# Patient Record
Sex: MALE | Race: BLACK OR AFRICAN AMERICAN
[De-identification: names, ages, dates, MRNs, and addresses within clinical notes are randomized per-mention and may not be internally consistent; named-entity substitution may affect disease eponyms.]

---

## 2018-11-07 ENCOUNTER — HOSPITAL ENCOUNTER (EMERGENCY)
Dept: HOSPITAL 88 - ER | Age: 58
Discharge: NURSING, REHAB OR LONG-TERM CARE | End: 2018-11-07
Payer: MEDICARE

## 2018-11-07 VITALS — HEIGHT: 64 IN | WEIGHT: 156 LBS | BODY MASS INDEX: 26.63 KG/M2

## 2018-11-07 DIAGNOSIS — K70.10: ICD-10-CM

## 2018-11-07 DIAGNOSIS — G31.2: Primary | ICD-10-CM

## 2018-11-07 DIAGNOSIS — F17.210: ICD-10-CM

## 2018-11-07 DIAGNOSIS — N18.9: ICD-10-CM

## 2018-11-07 DIAGNOSIS — I10: ICD-10-CM

## 2018-11-07 LAB
ALBUMIN SERPL-MCNC: 3.4 G/DL (ref 3.5–5)
ALBUMIN/GLOB SERPL: 0.9 {RATIO} (ref 0.8–2)
ALP SERPL-CCNC: 68 IU/L (ref 40–150)
ALT SERPL-CCNC: 111 IU/L (ref 0–55)
AMPHETAMINES UR QL SCN>1000 NG/ML: NEGATIVE
ANION GAP SERPL CALC-SCNC: 12.9 MMOL/L (ref 8–16)
BACTERIA URNS QL MICRO: (no result) /HPF
BASOPHILS # BLD AUTO: 0 10*3/UL (ref 0–0.1)
BASOPHILS NFR BLD AUTO: 0.5 % (ref 0–1)
BENZODIAZ UR QL SCN: POSITIVE
BILIRUB UR QL: NEGATIVE
BUN SERPL-MCNC: 12 MG/DL (ref 7–26)
BUN/CREAT SERPL: 9 (ref 6–25)
CALCIUM SERPL-MCNC: 9.5 MG/DL (ref 8.4–10.2)
CHLORIDE SERPL-SCNC: 102 MMOL/L (ref 98–107)
CK MB SERPL-MCNC: 1 NG/ML (ref 0–5)
CK SERPL-CCNC: 85 IU/L (ref 30–200)
CLARITY UR: CLEAR
CO2 SERPL-SCNC: 25 MMOL/L (ref 22–29)
COLOR UR: YELLOW
DEPRECATED APTT PLAS QN: 28.4 SECONDS (ref 23.8–35.5)
DEPRECATED INR PLAS: 0.87
DEPRECATED NEUTROPHILS # BLD AUTO: 2.5 10*3/UL (ref 2.1–6.9)
DEPRECATED PHOSPHATE SERPL-MCNC: 2.4 MG/DL (ref 2.3–4.7)
EGFRCR SERPLBLD CKD-EPI 2021: > 60 ML/MIN (ref 60–?)
EOSINOPHIL # BLD AUTO: 0.2 10*3/UL (ref 0–0.4)
EOSINOPHIL NFR BLD AUTO: 3.8 % (ref 0–6)
EPI CELLS URNS QL MICRO: (no result) /LPF
ERYTHROCYTE [DISTWIDTH] IN CORD BLOOD: 12.7 % (ref 11.7–14.4)
GLOBULIN PLAS-MCNC: 3.8 G/DL (ref 2.3–3.5)
GLUCOSE SERPLBLD-MCNC: 88 MG/DL (ref 74–118)
HCT VFR BLD AUTO: 42.9 % (ref 38.2–49.6)
HGB BLD-MCNC: 14.7 G/DL (ref 14–18)
KETONES UR QL STRIP.AUTO: NEGATIVE
LEUKOCYTE ESTERASE UR QL STRIP.AUTO: NEGATIVE
LYMPHOCYTES # BLD: 1.3 10*3/UL (ref 1–3.2)
LYMPHOCYTES NFR BLD AUTO: 28.8 % (ref 18–39.1)
MAGNESIUM SERPL-MCNC: 1.8 MG/DL (ref 1.3–2.1)
MCH RBC QN AUTO: 30.6 PG (ref 28–32)
MCHC RBC AUTO-ENTMCNC: 34.3 G/DL (ref 31–35)
MCV RBC AUTO: 89.2 FL (ref 81–99)
MONOCYTES # BLD AUTO: 0.4 10*3/UL (ref 0.2–0.8)
MONOCYTES NFR BLD AUTO: 9.9 % (ref 4.4–11.3)
NEUTS SEG NFR BLD AUTO: 56.8 % (ref 38.7–80)
NITRITE UR QL STRIP.AUTO: NEGATIVE
PCP UR QL SCN: NEGATIVE
PLATELET # BLD AUTO: 149 X10E3/UL (ref 140–360)
POTASSIUM SERPL-SCNC: 3.9 MMOL/L (ref 3.5–5.1)
PROT UR QL STRIP.AUTO: NEGATIVE
PROTHROMBIN TIME: 12.6 SECONDS (ref 11.9–14.5)
RBC # BLD AUTO: 4.81 X10E6/UL (ref 4.3–5.7)
SODIUM SERPL-SCNC: 136 MMOL/L (ref 136–145)
SP GR UR STRIP: 1.02 (ref 1.01–1.02)
UROBILINOGEN UR STRIP-MCNC: 0.2 MG/DL (ref 0.2–1)
WBC #/AREA URNS HPF: (no result) /HPF (ref 0–5)

## 2018-11-07 PROCEDURE — 82550 ASSAY OF CK (CPK): CPT

## 2018-11-07 PROCEDURE — 83735 ASSAY OF MAGNESIUM: CPT

## 2018-11-07 PROCEDURE — 99284 EMERGENCY DEPT VISIT MOD MDM: CPT

## 2018-11-07 PROCEDURE — 82553 CREATINE MB FRACTION: CPT

## 2018-11-07 PROCEDURE — 70487 CT MAXILLOFACIAL W/DYE: CPT

## 2018-11-07 PROCEDURE — 84425 ASSAY OF VITAMIN B-1: CPT

## 2018-11-07 PROCEDURE — 84100 ASSAY OF PHOSPHORUS: CPT

## 2018-11-07 PROCEDURE — 82140 ASSAY OF AMMONIA: CPT

## 2018-11-07 PROCEDURE — 85730 THROMBOPLASTIN TIME PARTIAL: CPT

## 2018-11-07 PROCEDURE — 81001 URINALYSIS AUTO W/SCOPE: CPT

## 2018-11-07 PROCEDURE — 70450 CT HEAD/BRAIN W/O DYE: CPT

## 2018-11-07 PROCEDURE — 84484 ASSAY OF TROPONIN QUANT: CPT

## 2018-11-07 PROCEDURE — 36415 COLL VENOUS BLD VENIPUNCTURE: CPT

## 2018-11-07 PROCEDURE — 85025 COMPLETE CBC W/AUTO DIFF WBC: CPT

## 2018-11-07 PROCEDURE — 85610 PROTHROMBIN TIME: CPT

## 2018-11-07 PROCEDURE — 80307 DRUG TEST PRSMV CHEM ANLYZR: CPT

## 2018-11-07 PROCEDURE — 82607 VITAMIN B-12: CPT

## 2018-11-07 PROCEDURE — 80053 COMPREHEN METABOLIC PANEL: CPT

## 2018-11-07 NOTE — DIAGNOSTIC IMAGING REPORT
History:Unsteady gait and blurry vision

Comparison studies:None



Technique:

Axial images were obtained from the skull base to the vertex.

Coronal and sagittal images reconstructed from the axial data.

Intravenous contrast: None

Dose modulation, iterative reconstruction, and/or weight based adjustment of

the mA/kV was utilized to reduce the radiation dose to as low as reasonably

achievable.



Findings:



Scalp/skull: 

No abnormalities.



Extra-axial spaces: 

No masses.  No fluid collections.



Brain sulci: Age-appropriate.

Ventricles: Age-appropriate. No hydrocephalus.



Parenchyma: 

No abnormal densities. No masses, hemorrhage, acute or chronic cortical

vascular insults.



Sellar/suprasellar region: No abnormalities.

Craniocervical junction: Patent foramen magnum.  No Chiari one malformation.



Incidental findings:

Atherosclerotic calcifications in the carotid siphons . 

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



Impression:



No acute abnormalities.





Signed by: DR Vlad Parada M.D. on 11/7/2018 10:27 AM

## 2018-11-07 NOTE — DIAGNOSTIC IMAGING REPORT
History:Unsteady gait, blurred vision

Comparison studies: None



Technique:

Axial images were obtained through the maxillofacial region.

Coronal and sagittal images reconstructed from the axial data.

Intravenous contrast: None



Dose modulation, iterative reconstruction, and/or weight based adjustment of

the mA/kV was utilized to reduce the radiation dose to as low as reasonably

achievable.



Findings:



Soft tissues: 

No abnormalities.



Bones: 

No acute fractures or bone abnormalities.

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 Chronic fracture deformities

of the right maxillary sinus and right zygomatic bone with associated anterior

plate and screws. Metallic mesh is seen at the right orbital floor



Orbits: 

Globes: No acute abnormality. Chronic right orbital floor fracture with

adequate alignment and metallic mesh in place. No herniated orbital contents.

Extra or intraconal abnormalities: None.



Paranasal sinuses:

 Nonspecific mucosal thickening of the right maxillary, bilateral ethmoid and

frontal sinuses sinus.



Partially visualized degenerative changes of the cervical spine with anterior

projecting osteophytes from C3 through C7.



Periapical lucency at dentition #4 and 20.



IMPRESSION: 



1.  No acute facial abnormalities.



2.  Multiple chronic facial fractures as described above.



Signed by: DR Vlad Parada M.D. on 11/7/2018 2:13 PM